# Patient Record
Sex: FEMALE | Race: WHITE | URBAN - METROPOLITAN AREA
[De-identification: names, ages, dates, MRNs, and addresses within clinical notes are randomized per-mention and may not be internally consistent; named-entity substitution may affect disease eponyms.]

---

## 2018-07-17 ENCOUNTER — OFFICE VISIT (OUTPATIENT)
Dept: NEUROLOGY | Age: 38
End: 2018-07-17

## 2018-07-17 VITALS
DIASTOLIC BLOOD PRESSURE: 70 MMHG | WEIGHT: 166 LBS | HEART RATE: 72 BPM | OXYGEN SATURATION: 98 % | SYSTOLIC BLOOD PRESSURE: 110 MMHG

## 2018-07-17 DIAGNOSIS — R40.20 LOC (LOSS OF CONSCIOUSNESS) (HCC): Primary | ICD-10-CM

## 2018-07-17 RX ORDER — GARLIC 1000 MG
1 CAPSULE ORAL DAILY
COMMUNITY

## 2018-07-17 RX ORDER — CETIRIZINE HCL 10 MG
10 TABLET ORAL
COMMUNITY

## 2018-07-17 RX ORDER — ACETAMINOPHEN AND CODEINE PHOSPHATE 300; 30 MG/1; MG/1
300 TABLET ORAL AS NEEDED
COMMUNITY
Start: 2018-07-14

## 2018-07-17 RX ORDER — PREDNISONE 10 MG/1
TABLET ORAL
Qty: 42 TAB | Refills: 0 | Status: SHIPPED | OUTPATIENT
Start: 2018-07-17 | End: 2018-08-13 | Stop reason: ALTCHOICE

## 2018-07-17 RX ORDER — ACYCLOVIR 400 MG/1
400 TABLET ORAL AS NEEDED
COMMUNITY
Start: 2018-07-16

## 2018-07-17 RX ORDER — CALCIUM POLYCARBOPHIL 625 MG
625 TABLET ORAL DAILY
COMMUNITY

## 2018-07-17 RX ORDER — GLUCOSAMINE/CHONDROITIN/C/MANG 500-400 MG
CAPSULE ORAL DAILY
COMMUNITY

## 2018-07-17 NOTE — LETTER
7/17/2018 11:16 AM 
 
Patient:  Keyonna Trejo YOB: 1980 Date of Visit: 7/17/2018 Dear No Recipients: Thank you for referring Ms. Lisbeth Valverde to me for evaluation/treatment. Below are the relevant portions of my assessment and plan of care. If you have questions, please do not hesitate to call me. I look forward to following Ms. Munguia along with you. Sincerely, Deedee Bearden MD

## 2018-07-17 NOTE — LETTER
7/17/2018 11:50 AM 
 
Patient:  Lashonda Garcia YOB: 1980 Date of Visit: 7/17/2018 Dear No Recipients: Thank you for referring Ms. Mendel Andres to me for evaluation/treatment. Below are the relevant portions of my assessment and plan of care. If you have questions, please do not hesitate to call me. I look forward to following Ms. Munguia along with you. Sincerely, Maye Lane MD

## 2018-07-17 NOTE — PATIENT INSTRUCTIONS
Vasovagal Syncope: Care Instructions  Your Care Instructions    Vasovagal syncope (say \"ops-eih-EOXAntonio FLORES-kuh-pee\")is sudden dizziness or fainting that can be set off by things such as pain, stress, fear, or trauma. You may sweat or feel lightheaded, sick to your stomach, or tingly. The problem causes the heart rate to slow and the blood vessels to widen, or dilate, for a short time. When this happens, blood pools in the lower body, and less blood goes to the brain. You can usually get relief by lying down with your legs raised (elevated). This helps more blood to flow to your brain and may help relieve symptoms like feeling dizzy. Some doctors may recommend a technique that involves tensing your fists and arms. This type of fainting is often easy to predict. For example, it happens to some people when they see blood or have to get a shot. They may feel symptoms before they faint. An episode of vasovagal syncope usually responds well to self-care. Other treatment often isn't needed. But if the fainting keeps happening, your doctor may suggest further treatments. Follow-up care is a key part of your treatment and safety. Be sure to make and go to all appointments, and call your doctor if you are having problems. It's also a good idea to know your test results and keep a list of the medicines you take. How can you care for yourself at home? · Drink plenty of fluids to prevent dehydration. If you have kidney, heart, or liver disease and have to limit fluids, talk with your doctor before you increase your fluid intake. · Try to avoid things that you think may set off vasovagal syncope. · Talk to your doctor about any medicines you take. Some medicines may increase the chance of this condition occurring. · If you feel symptoms, lie down with your legs raised. Talk to your doctor about what to do if your symptoms come back. When should you call for help?   Call 911 anytime you think you may need emergency care. For example, call if:    · You have symptoms of a heart problem. These may include:  ¨ Chest pain or pressure. ¨ Severe trouble breathing. ¨ A fast or irregular heartbeat.    Watch closely for changes in your health, and be sure to contact your doctor if:    · You have more episodes of fainting at home.     · You do not get better as expected. Where can you learn more? Go to http://clara-dileep.info/. Enter L754 in the search box to learn more about \"Vasovagal Syncope: Care Instructions. \"  Current as of: November 20, 2017  Content Version: 11.7  © 0186-9462 [employer]. Care instructions adapted under license by [employer] (which disclaims liability or warranty for this information). If you have questions about a medical condition or this instruction, always ask your healthcare professional. [address] any warranty or liability for your use of this information.
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
## 2018-07-23 ENCOUNTER — TELEPHONE (OUTPATIENT)
Dept: NEUROLOGY | Age: 38
End: 2018-07-23

## 2018-07-23 NOTE — TELEPHONE ENCOUNTER
Called insurance co for PA ans testing  [id number]  Spoke to Jelani Arroyo and no pa for [id number]  Will send clinicals to [phone]  Pending case #[id number]

## 2018-07-25 NOTE — TELEPHONE ENCOUNTER
----- Message from Gustavo Lombardi sent at 7/25/2018  9:29 AM EDT -----  Regarding: Dr Ursula Jay  Pt is following up on referral for appt for ANS testing.     Best contact # [phone]

## 2018-07-25 NOTE — TELEPHONE ENCOUNTER
Called and spoke to patient let her know pa is pending will call to set up appt once approved  Patient states understanding

## 2018-08-01 NOTE — TELEPHONE ENCOUNTER
Called and spoke to Robert and for ans testing [id number] (complete test)  [id number] (tilt table only) all denied due to investigational/expermintal

## 2018-08-13 ENCOUNTER — TELEPHONE (OUTPATIENT)
Dept: NEUROLOGY | Age: 38
End: 2018-08-13

## 2018-08-13 ENCOUNTER — OFFICE VISIT (OUTPATIENT)
Dept: CARDIOLOGY CLINIC | Age: 38
End: 2018-08-13

## 2018-08-13 VITALS
RESPIRATION RATE: 16 BRPM | HEART RATE: 96 BPM | BODY MASS INDEX: 30.02 KG/M2 | OXYGEN SATURATION: 98 % | WEIGHT: 169.4 LBS | HEIGHT: 63 IN | SYSTOLIC BLOOD PRESSURE: 124 MMHG | DIASTOLIC BLOOD PRESSURE: 78 MMHG

## 2018-08-13 DIAGNOSIS — R55 VASOVAGAL SYNCOPE: Primary | ICD-10-CM

## 2018-08-13 NOTE — TELEPHONE ENCOUNTER
TC- Patient c/o having swollen face and tingling noted to hands and feet every morning. Patient will f/u after EEG testing with Dr. Michele Jordan. Dr. Michele Jordan notified of the above.

## 2018-08-13 NOTE — PROGRESS NOTES
Todd Fuentes, [facility]  [address] Tim [place], Jr [place], [address] [facility]    Office [phone]  Fax [phone]  Cell [phone]        Shirin Narvaez is a 42 y.o. female referred for evaluation of syncopal episode. Assessment  Encounter Diagnosis   Name Primary?  Vasovagal syncope Yes     Recommendations:  Recent syncope, consistent with vasovagal mechanism. She has nothing to suggest ischemic/structural heart disease based on her hx, exam and EKG. She has a bunch of sxs that sound neurologic in origin. Further evaluation is underway in this reguard. No further cardiac testing is necessary. She is in agreement with this. I will be happy to see her back as needed. Follow-up Disposition: Not on File     Subjective:  Ms. Gerry Bliss states on 7/8/18 she woke up with abdominal pain. She stood up to go to the bathroom and experienced onset of diaphoresis and a hot/flushed sensation. She then laid down and lost consciousness. She states she experienced similar episodes as a child due to stress, but had not has any syncopal episodes in many years. She has not had any additional episodes of abdominal pain since. She states for two weeks after the episode she experienced head pain/pressure that was not improved by Tylenol and Advil. She saw a Neurologist and was started prednisone, with improvement. She also experienced intermittent blurred vision, and had a normal Opthalmology exam. She has a family history of seizures, and has an EEG scheduled. She states over the past two weeks she has also woken up with facial swelling as well as numbness/paresthesias in her face, arms and legs. She further notes some intermittent extremity heaviness. Patient works in . She exercises regularly, with no exertional symptoms. Patient denies any exertional chest pain, dyspnea, palpitations, orthopnea, edema or paroxysmal nocturnal dyspnea.  She denies increase stress at home or at work. She further denies alcohol use or cigarette use. Cardiac risk factors   HTN no  DM no  Smoking: no    Cardiac testing  EKG 8/13/18 - SR, normal EKG    History reviewed. No pertinent past medical history. Current Outpatient Prescriptions   Medication Sig Dispense Refill    acyclovir (ZOVIRAX) 400 mg tablet Take 400 mg by mouth as needed.  cetirizine (ZYRTEC) 10 mg tablet Take 10 mg by mouth daily as needed.  garlic 5,985 mg cap Take 1 Tab by mouth daily.  BIOTIN PO Take 500 mg by mouth daily.  ECHINACEA PO Take 1 Tab by mouth daily.  calcium polycarbophil (FIBERCON) 625 mg tablet Take 625 mg by mouth daily.  multivit,calc,mins/iron/folic (ONE-A-DAY WOMENS FORMULA PO) Take 1 Tab by mouth daily.  glucosamine-chondroit-vit c-mn (GLUCOSAMINE 1500 COMPLEX) 500-400 mg capsule Take  by mouth daily.  fluticasone propionate (FLONASE NA) by Nasal route daily.  acetaminophen-codeine (TYLENOL #3) 300-30 mg per tablet Take 300 Tabs by mouth as needed. No Known Allergies       Review of Systems  Constitutional: Negative for fever, chills, malaise/fatigue and diaphoresis. Respiratory: Negative for cough, hemoptysis, sputum production, shortness of breath and wheezing. Cardiovascular: Negative for chest pain, palpitations, orthopnea, claudication, leg swelling and PND. +syncope  Gastrointestinal: Negative for heartburn, nausea, vomiting, blood in stool and melena. Genitourinary: Negative for dysuria and flank pain. Musculoskeletal: Negative for joint pain and back pain. Skin: Negative for rash. Neurological: Negative for focal weakness, seizures, loss of consciousness, weakness and headaches. +Numbness/paresthesia  Endo/Heme/Allergies: Does not bruise/bleed easily. Psychiatric/Behavioral: Negative for memory loss. The patient does not have insomnia.       Physical Exam    Visit Vitals    /78 (BP 1 Location: Left arm, BP Patient Position: Sitting)    Pulse 96    Resp 16    Ht 5' 3\" (1.6 m)    Wt 169 lb 6.4 oz (76.8 kg)    SpO2 98%    BMI 30.01 kg/m2     Wt Readings from Last 3 Encounters:   08/13/18 169 lb 6.4 oz (76.8 kg)   07/17/18 166 lb (75.3 kg)      General - well developed well nourished  Neck - JVP normal, thyroid nl  Cardiac - normal S1, S2, no murmurs, rubs or gallops. No clicks  Vascular - carotids without bruits, radials, femorals and pedal pulses equal bilateral  Lungs - clear to auscultation bilaterals, no rales, wheezing or rhonchi  Abd - soft nontender, no HSM, no abd bruits  Extremities - no edema  Skin - no rash  Neuro - nonfocal  Psych - normal mood and affect      Cardiographics  EKG 8/13/18 - SR, normal EKG    Written by Sarah Pardo, as dictated by Dr. Ta Grimaldo.    Ta Grimaldo MD

## 2018-08-13 NOTE — PROGRESS NOTES
Chief Complaint   Patient presents with    New Patient    Syncope     denies any today     1. Have you been to the ER, urgent care clinic since your last visit? Hospitalized since your last visit? No    2. Have you seen or consulted any other health care providers outside of the [facility] since your last visit? Include any pap smears or colon screening.  No    Visit Vitals    /78 (BP 1 Location: Left arm, BP Patient Position: Sitting)    Pulse 96    Resp 16    Ht 5' 3\" (1.6 m)    Wt 169 lb 6.4 oz (76.8 kg)    SpO2 98%    BMI 30.01 kg/m2

## 2018-08-13 NOTE — MR AVS SNAPSHOT
[address] [facility] [address] [phone] Patient: Shirin Narvaez MRN: [id number] :1980 Visit Information Date & Time Provider Department Dept. Phone Encounter #  
 2018  2:40 PM Madina Xiao MD [facility] [place] [phone] [id number] Follow-up Instructions Return if symptoms worsen or fail to improve. Your Appointments 8/15/2018  8:30 AM  
PROCEDURE with EEG SCHEDULE  [facility] ([address] [place] [address]) Appt Note: Sleep Deprived EEG  
 [facility] [address] [facility] [address] [id number] [phone]  
  
   
 [place] [address] [facility] [address] [address] [address] Upcoming Health Maintenance Date Due DTaP/Tdap/Td series (1 - Tdap) 1999 PAP AKA CERVICAL CYTOLOGY 1999 Influenza Age 5 to Adult 2018 Allergies as of 2018  Review Complete On: 2018 By: Chad Villarreal LPN No Known Allergies Current Immunizations  Reviewed on 2018 No immunizations on file. Reviewed by Chad Villarreal LPN on  at  2:32 PM  
You Were Diagnosed With   
  
 Codes Comments Syncope and collapse    -  Primary ICD-10-CM: R55 
ICD-9-CM: 780. 2 Vitals BP Pulse Resp Height(growth percentile) Weight(growth percentile) SpO2  
 124/78 (BP 1 Location: Left arm, BP Patient Position: Sitting) 96 16 5' 3\" (1.6 m) 169 lb 6.4 oz (76.8 kg) 98% BMI Smoking Status 30.01 kg/m2 Never Smoker Vitals History BMI and BSA Data Body Mass Index Body Surface Area 30.01 kg/m 2 1.85 m 2 Preferred Pharmacy Pharmacy Name Phone [facility] [address] [phone] Your Updated Medication List  
  
   
 This list is accurate as of 8/13/18  3:24 PM.  Always use your most recent med list.  
  
  
  
  
 acetaminophen-codeine 300-30 mg per tablet Commonly known as:  TYLENOL #3 Take 300 Tabs by mouth as needed. acyclovir 400 mg tablet Commonly known as:  ZOVIRAX Take 400 mg by mouth as needed. BIOTIN PO Take 500 mg by mouth daily. ECHINACEA PO Take 1 Tab by mouth daily. FIBERCON 625 mg tablet Generic drug:  calcium polycarbophil Take 625 mg by mouth daily. FLONASE NA  
by Nasal route daily. garlic 9,457 mg Cap Take 1 Tab by mouth daily. GLUCOSAMINE 1500 COMPLEX 500-400 mg capsule Generic drug:  glucosamine-chondroit-vit c-mn Take  by mouth daily. ONE-A-DAY WOMENS FORMULA PO Take 1 Tab by mouth daily. ZyrTEC 10 mg tablet Generic drug:  cetirizine Take 10 mg by mouth daily as needed. We Performed the Following AMB POC EKG ROUTINE W/ 12 LEADS, INTER & REP [37206 CPT(R)] Follow-up Instructions Return if symptoms worsen or fail to improve. Introducing [facility]! [facility] introduces [employer] patient portal. Now you can access parts of your medical record, email your doctor's office, and request medication refills online. 1. In your internet browser, go to [email or web address] 2. Click on the First Time User? Click Here link in the Sign In box. You will see the New Member Sign Up page. 3. Enter your [employer] Access Code exactly as it appears below. You will not need to use this code after youve completed the sign-up process. If you do not sign up before the expiration date, you must request a new code. · [employer] Access Code: [email or web address] Expires: 10/15/2018 10:59 AM 
 
4. Enter the last four digits of your Social Security Number (xxxx) and Date of Birth (mm/dd/yyyy) as indicated and click Submit. You will be taken to the next sign-up page. 5. Create a Smartisan ID. This will be your Smartisan login ID and cannot be changed, so think of one that is secure and easy to remember. 6. Create a Smartisan password. You can change your password at any time. 7. Enter your Password Reset Question and Answer. This can be used at a later time if you forget your password. 8. Enter your e-mail address. You will receive e-mail notification when new information is available in [address] 9. Click Sign Up. You can now view and download portions of your medical record. 10. Click the Download Summary menu link to download a portable copy of your medical information. If you have questions, please visit the Frequently Asked Questions section of the Smart[employer] website. Remember, [employer] is NOT to be used for urgent needs. For medical emergencies, dial 911. Now available from your iPhone and Android! Please provide this summary of care documentation to your next provider. If you have any questions after today's visit, please call [phone].

## 2018-08-15 ENCOUNTER — OFFICE VISIT (OUTPATIENT)
Dept: NEUROLOGY | Age: 38
End: 2018-08-15

## 2018-08-15 DIAGNOSIS — R40.20 LOC (LOSS OF CONSCIOUSNESS) (HCC): ICD-10-CM

## 2018-08-16 ENCOUNTER — TELEPHONE (OUTPATIENT)
Dept: NEUROLOGY | Age: 38
End: 2018-08-16

## 2018-08-16 NOTE — TELEPHONE ENCOUNTER
----- Message from Raheem Finch sent at 8/16/2018 12:23 PM EDT -----  Regarding: Dr. Rashmi Kurtz  Pt inquiring if the results of EEG done yesterday Wednesday 08/15/18 are in. Requesting to speak with the nurse in regards to this matter.    Best contact number [phone]

## 2018-08-16 NOTE — TELEPHONE ENCOUNTER
TC- Notified patient EEG results was not in system for Dr. Ksenia Caban to review. Will notify patient Dr. Ksenia Caban review results. Patient verbalized understanding.

## 2018-08-19 NOTE — PROCEDURES
Patient Name: Za Rnagel  : 1980  Age: 42 y.o. Ordering physician: No ref. provider found  Date of EE2018  EEG procedure number: [id number]  Diagnosis: Loss of consciousness  Interpreting physician: Louise Lang MD      ELECTROENCEPHALOGRAM REPORT     PROCEDURE: EEG. CLINICAL INDICATION: The patient is a 42 y.o. female with a history of   possible seizures. EEG to rule out seizures, rule out stroke, rule out   cortical abnormality. EEG CLASSIFICATION: Essentially normal    DESCRIPTION OF THE RECORD:   The background of this recording contains a posteriorly-located occipital alpha rhythm of 11 Hz that attenuates with eye opening. Throughout the recording, there were no clear areas of focal slowing nor spike or spike-and-wave discharges seen. Hyperventilation produced no response. Photic stimulation produced a minimal driving response in the posterior head regions. During the recording, the patient did enter prolonged states of sleep with K-complexes and sleep spindles seen in the central head regions. INTERPRETATION: This is a normal electroencephalogram showing no clear focal abnormalities or epileptiform activity. A normal EEG doesn't not rule out seizures. Clinical correlation recommended.         Giorgio Payne MD  2018  3:24 PM

## 2018-08-21 ENCOUNTER — TELEPHONE (OUTPATIENT)
Dept: NEUROLOGY | Age: 38
End: 2018-08-21

## 2018-08-21 NOTE — TELEPHONE ENCOUNTER
TC- informed patient EEG is normal.   Advised patient to  follow up with PCP if she still has leg swelling. See cardiology if she still has fainting spells   and see GI doctor if she still has abdominal issues. Patient verbalized understanding.

## 2023-02-24 NOTE — MR AVS SNAPSHOT
Diagnoses and all orders for this visit:    Viral URI with cough    Supportive care:   Rest   Encourage fluids to maintain hydration and to help thin secretions  Nasal saline (with suctioning if infant)  Cool mist humidifier (avoid heated humidifiers as they may contain harmful bacteria)  Pain/fever relief:  Ibuprofen as directed every 6-8 hours as needed  Tylenol as directed every 4-6 hours as needed    Call or return to clinic if they develop new fever or rash, fever lasting more than 2-3 days, trouble breathing, signs of dehydration, worsening symptoms, symptoms that are not improving or any other concern. If after hours, call the on-call line [phone]?or?[phone].or if an emergency, call [phone].    Ilana Pope 
 
 
 [place] [address] [facility] [address] [facility] [id number] [phone] Patient: Shirin Narvaez MRN: [id number] :1980 Visit Information Date & Time Provider Department Dept. Phone Encounter #  
 2018 11:00 AM Arianna Chambers MD [facility] Neurology [facility] [phone] [id number] Follow-up Instructions Return for review of results. Upcoming Health Maintenance Date Due DTaP/Tdap/Td series (1 - Tdap) 1999 PAP AKA CERVICAL CYTOLOGY 1999 Influenza Age 5 to Adult 2018 Allergies as of 2018  Review Complete On: 2018 By: Arianna Chambers MD  
 Not on File Current Immunizations  Never Reviewed No immunizations on file. Not reviewed this visit You Were Diagnosed With   
  
 Codes Comments [facility]    -  Primary ICD-10-CM: R40.20 ICD-9-CM: 780.09 Vitals BP Pulse Weight(growth percentile) SpO2  
  
  
 110/70 72 166 lb (75.3 kg) 98% Preferred Pharmacy Pharmacy Name Phone [facility] [address] AT  [address] [phone] Your Updated Medication List  
  
   
This list is accurate as of 18 11:43 AM.  Always use your most recent med list.  
  
  
  
  
 acetaminophen-codeine 300-30 mg per tablet Commonly known as:  TYLENOL #3 Take 300 Tabs by mouth as needed. acyclovir 400 mg tablet Commonly known as:  ZOVIRAX Take 400 mg by mouth as needed. BIOTIN PO Take 500 mg by mouth. ECHINACEA PO Take  by mouth. FIBERCON 625 mg tablet Generic drug:  calcium polycarbophil Take 625 mg by mouth daily. FLONASE NA  
by Nasal route.  
  
 garlic 6,206 mg Cap Take  by mouth. GLUCOSAMINE 1500 COMPLEX 500-400 mg capsule Generic drug:  glucosamine-chondroit-vit c-mn Take  by mouth daily. ONE-A-DAY WOMENS FORMULA PO Take  by mouth.  
  
 predniSONE 10 mg tablet Commonly known as:  Teodoro Steven Take 6 tabs for 2 days, 5 tabs for 2 days, 4 tabs for 2 days, 3 tabs for 2 days, 2 tabs for 2 days, then 1 tab for 2 days, then stop ZyrTEC 10 mg tablet Generic drug:  cetirizine Take  by mouth. Prescriptions Sent to Pharmacy Refills  
 predniSONE (DELTASONE) 10 mg tablet 0 Sig: Take 6 tabs for 2 days, 5 tabs for 2 days, 4 tabs for 2 days, 3 tabs for 2 days, 2 tabs for 2 days, then 1 tab for 2 days, then stop Class: Normal  
 Pharm[facility]: [facility] AT [address] #: [phone] We Performed the Following REFERRAL TO CARDIOLOGY [KJV39 Custom] Comments:  
 Evaluate and treat for cardiac cause of syncope; possible vasovagal syncope TSTG ANS FUNCJ PARASYMP&SYMP W/5 MIN PASIVE TILT [37165 CPT(R)] Comments:  
 Refer to [facility] for ANS testing. [address] [place] [address] Neurology Clinic Phone: [phone] Follow-up Instructions Return for review of results. To-Do List   
 07/17/2018 Neurology:  EEG SLEEP DEPRIVED AMB NEURO Referral Information Referral ID Referred By Referred To  
  
 [id number] ADOLFO Zimmerman Not Available Visits Status Start Date End Date 1 New Request 7/17/18 7/17/19 If your referral has a status of pending review or denied, additional information will be sent to support the outcome of this decision. Patient Instructions Vasovagal Syncope: Care Instructions Your Care Instructions Vasovagal syncope (say \"yms-ywu-CGO-gul CVKL-ruz-uqx\")is sudden dizziness or fainting that can be set off by things such as pain, stress, fear, or trauma. You may sweat or feel lightheaded, sick to your stomach, or tingly.  
The problem causes the heart rate to slow and the blood vessels to widen, or dilate, for a short time. When this happens, blood pools in the lower body, and less blood goes to the brain. You can usually get relief by lying down with your legs raised (elevated). This helps more blood to flow to your brain and may help relieve symptoms like feeling dizzy. Some doctors may recommend a technique that involves tensing your fists and arms. This type of fainting is often easy to predict. For example, it happens to some people when they see blood or have to get a shot. They may feel symptoms before they faint. An episode of vasovagal syncope usually responds well to self-care. Other treatment often isn't needed. But if the fainting keeps happening, your doctor may suggest further treatments. Follow-up care is a key part of your treatment and safety. Be sure to make and go to all appointments, and call your doctor if you are having problems. It's also a good idea to know your test results and keep a list of the medicines you take. How can you care for yourself at home? · Drink plenty of fluids to prevent dehydration. If you have kidney, heart, or liver disease and have to limit fluids, talk with your doctor before you increase your fluid intake. · Try to avoid things that you think may set off vasovagal syncope. · Talk to your doctor about any medicines you take. Some medicines may increase the chance of this condition occurring. · If you feel symptoms, lie down with your legs raised. Talk to your doctor about what to do if your symptoms come back. When should you call for help? Call 911 anytime you think you may need emergency care. For example, call if: 
  · You have symptoms of a heart problem. These may include: ¨ Chest pain or pressure. ¨ Severe trouble breathing. ¨ A fast or irregular heartbeat.  
 Watch closely for changes in your health, and be sure to contact your doctor if: 
  · You have more episodes of fainting at home.  
  · You do not get better as expected. Where can you learn more? Go to http://clara-dileep.info/. Enter L754 in the search box to learn more about \"Vasovagal Syncope: Care Instructions. \" Current as of: November 20, 2017 Content Version: 11.7 © 4076-3436 [employer]. Care instructions adapted under license by [employer] (which disclaims liability or warranty for this information). If you have questions about a medical condition or this instruction, always ask your healthcare professional. [address] any warranty or liability for your use of this information. Introducing [facility]! Everett Soto introduces Yeelink patient portal. Now you can access parts of your medical record, email your doctor's office, and request medication refills online. 1. In your internet browser, go to [email or web address] 2. Click on the First Time User? Click Here link in the Sign In box. You will see the New Member Sign Up page. 3. Enter your Yeelink Access Code exactly as it appears below. You will not need to use this code after youve completed the sign-up process. If you do not sign up before the expiration date, you must request a new code. · Yeelink Access Code: [email or web address] Expires: 10/15/2018 10:59 AM 
 
4. Enter the last four digits of your Social Security Number (xxxx) and Date of Birth (mm/dd/yyyy) as indicated and click Submit. You will be taken to the next sign-up page. 5. Create a Yeelink ID. This will be your Yeelink login ID and cannot be changed, so think of one that is secure and easy to remember. 6. Create a Yeelink password. You can change your password at any time. 7. Enter your Password Reset Question and Answer. This can be used at a later time if you forget your password. 8. Enter your e-mail address. You will receive e-mail notification when new information is available in [address] 9. Click Sign Up. You can now view and download portions of your medical record. 10. Click the Download Summary menu link to download a portable copy of your medical information. If you have questions, please visit the Frequently Asked Questions section of the [employer] website. Remember, [employer] is NOT to be used for urgent needs. For medical emergencies, dial 911. Now available from your iPhone and Android! Please provide this summary of care documentation to your next provider. Your primary care clinician is listed as Phys Other. If you have any questions after today's visit, please call [phone].